# Patient Record
Sex: FEMALE | Race: BLACK OR AFRICAN AMERICAN | NOT HISPANIC OR LATINO | Employment: FULL TIME | URBAN - METROPOLITAN AREA
[De-identification: names, ages, dates, MRNs, and addresses within clinical notes are randomized per-mention and may not be internally consistent; named-entity substitution may affect disease eponyms.]

---

## 2018-01-15 ENCOUNTER — LAB VISIT (OUTPATIENT)
Dept: LAB | Facility: HOSPITAL | Age: 38
End: 2018-01-15
Attending: OBSTETRICS & GYNECOLOGY
Payer: COMMERCIAL

## 2018-01-15 ENCOUNTER — OFFICE VISIT (OUTPATIENT)
Dept: OBSTETRICS AND GYNECOLOGY | Facility: CLINIC | Age: 38
End: 2018-01-15
Payer: COMMERCIAL

## 2018-01-15 VITALS
HEIGHT: 66 IN | BODY MASS INDEX: 43.52 KG/M2 | SYSTOLIC BLOOD PRESSURE: 122 MMHG | DIASTOLIC BLOOD PRESSURE: 81 MMHG | WEIGHT: 270.81 LBS

## 2018-01-15 DIAGNOSIS — R53.82 CHRONIC FATIGUE: ICD-10-CM

## 2018-01-15 DIAGNOSIS — N76.0 BACTERIAL VAGINOSIS: ICD-10-CM

## 2018-01-15 DIAGNOSIS — Z12.31 SCREENING MAMMOGRAM, ENCOUNTER FOR: ICD-10-CM

## 2018-01-15 DIAGNOSIS — Z01.419 ENCOUNTER FOR GYNECOLOGICAL EXAMINATION (GENERAL) (ROUTINE) WITHOUT ABNORMAL FINDINGS: Primary | ICD-10-CM

## 2018-01-15 DIAGNOSIS — N92.6 IRREGULAR BLEEDING: ICD-10-CM

## 2018-01-15 DIAGNOSIS — B96.89 BACTERIAL VAGINOSIS: ICD-10-CM

## 2018-01-15 LAB
25(OH)D3+25(OH)D2 SERPL-MCNC: 22 NG/ML
ERYTHROCYTE [DISTWIDTH] IN BLOOD BY AUTOMATED COUNT: 13.4 %
HCT VFR BLD AUTO: 33.8 %
HGB BLD-MCNC: 10.5 G/DL
MCH RBC QN AUTO: 27.4 PG
MCHC RBC AUTO-ENTMCNC: 31.1 G/DL
MCV RBC AUTO: 88 FL
PLATELET # BLD AUTO: 408 K/UL
PMV BLD AUTO: 11.2 FL
RBC # BLD AUTO: 3.83 M/UL
T3FREE SERPL-MCNC: 2.1 PG/ML
T4 SERPL-MCNC: 6.5 UG/DL
TSH SERPL DL<=0.005 MIU/L-ACNC: 0.69 UIU/ML
WBC # BLD AUTO: 8.49 K/UL

## 2018-01-15 PROCEDURE — 88175 CYTOPATH C/V AUTO FLUID REDO: CPT

## 2018-01-15 PROCEDURE — 99999 PR PBB SHADOW E&M-NEW PATIENT-LVL II: CPT | Mod: PBBFAC,,, | Performed by: OBSTETRICS & GYNECOLOGY

## 2018-01-15 PROCEDURE — 84443 ASSAY THYROID STIM HORMONE: CPT

## 2018-01-15 PROCEDURE — 36415 COLL VENOUS BLD VENIPUNCTURE: CPT | Mod: PO

## 2018-01-15 PROCEDURE — 84436 ASSAY OF TOTAL THYROXINE: CPT

## 2018-01-15 PROCEDURE — 82306 VITAMIN D 25 HYDROXY: CPT

## 2018-01-15 PROCEDURE — 87480 CANDIDA DNA DIR PROBE: CPT

## 2018-01-15 PROCEDURE — 99395 PREV VISIT EST AGE 18-39: CPT | Mod: S$GLB,,, | Performed by: OBSTETRICS & GYNECOLOGY

## 2018-01-15 PROCEDURE — 85027 COMPLETE CBC AUTOMATED: CPT

## 2018-01-15 PROCEDURE — 84481 FREE ASSAY (FT-3): CPT

## 2018-01-15 RX ORDER — OXYCODONE AND ACETAMINOPHEN 7.5; 325 MG/1; MG/1
1 TABLET ORAL
COMMUNITY
End: 2018-01-15

## 2018-01-15 NOTE — PROGRESS NOTES
Subjective:       Patient ID: Santhosh Olsen is a 37 y.o. female.    Chief Complaint:  Well Woman      History of Present Illness  HPI  Annual Exam-Premenopausal  Patient presents for annual exam. The patient c/o irreg bleeding and vaginal discharge; and increased fatigue for past year;   The patient is sexually active--denies pelvic pain;reports decreased libido;  GYN screening history: last pap: approximate date  and was normal. The patient wears seatbelts: yes. The patient participates in regular exercise: no. Has the patient ever been transfused or tattooed?: yes--tatooes . The patient reports that there is not domestic violence in her life.    Mom  from ov cancer 2016    Maternal male cousin diagnosed with breast cancer    C/o vaginal discharge for past 2 wks--reports no change in soaps/detergents;       Had ablation , amenorrheic for 1 yr; menses monthly until 2017, now bleeds q 21 days; flow 3 d, pads-reg; change q3 hrs; no double up; no soak through; increased dysmenorrhea--advil/aleve--takes edge off      GYN & OB History  Patient's last menstrual period was 2018.   Date of Last Pap: No result found    OB History    Para Term  AB Living   3 3       3   SAB TAB Ectopic Multiple Live Births           3      # Outcome Date GA Lbr Manav/2nd Weight Sex Delivery Anes PTL Lv   3 Para      CS-LTranv   LEONEL   2 Para      CS-LTranv   LEONEL   1 Para      CS-LTranv   LEONEL          Review of Systems  Review of Systems   Constitutional: Negative for activity change, appetite change, chills, diaphoresis, fatigue, fever and unexpected weight change.   HENT: Negative for mouth sores and tinnitus.    Eyes: Negative for discharge and visual disturbance.   Respiratory: Negative for cough, shortness of breath and wheezing.    Cardiovascular: Negative for chest pain, palpitations and leg swelling.   Gastrointestinal: Negative for abdominal pain, bloating, blood in stool, constipation, diarrhea,  nausea and vomiting.   Endocrine: Negative for diabetes, hair loss, hot flashes, hyperthyroidism and hypothyroidism.   Genitourinary: Positive for decreased libido, menstrual problem, vaginal discharge and dysmenorrhea. Negative for dyspareunia, dysuria, flank pain, frequency, genital sores, hematuria, menorrhagia, pelvic pain, urgency, vaginal bleeding, vaginal pain, urinary incontinence, postcoital bleeding, postmenopausal bleeding and vaginal odor.   Musculoskeletal: Negative for back pain and myalgias.   Skin:  Negative for rash, no acne and hair changes.   Neurological: Negative for seizures, syncope, numbness and headaches.   Hematological: Negative for adenopathy. Does not bruise/bleed easily.   Psychiatric/Behavioral: Negative for depression and sleep disturbance. The patient is not nervous/anxious.    Breast: Negative for breast mass, breast pain, nipple discharge and skin changes          Objective:    Physical Exam:   Constitutional: She appears well-developed.     Eyes: Conjunctivae and EOM are normal. Pupils are equal, round, and reactive to light.    Neck: Normal range of motion. Neck supple.     Pulmonary/Chest: Effort normal. Right breast exhibits no mass, no nipple discharge, no skin change and no tenderness. Left breast exhibits no mass, no nipple discharge, no skin change and no tenderness. Breasts are symmetrical.        Abdominal: Soft.     Genitourinary: Rectum normal and uterus normal. Pelvic exam was performed with patient supine. Cervix is normal. Right adnexum displays no mass and no tenderness. Left adnexum displays no mass and no tenderness. No erythema, bleeding, rectocele, cystocele or unspecified prolapse of vaginal walls in the vagina. Vaginal discharge: white. Labial bartholins normal.       Uterus Size: 6 cm   Musculoskeletal: Normal range of motion.       Neurological: She is alert.    Skin: Skin is warm.    Psychiatric: She has a normal mood and affect.          Assessment:          Encounter Diagnoses   Name Primary?    Chronic fatigue     Encounter for gynecological examination (general) (routine) without abnormal findings Yes    Screening mammogram, encounter for     Bacterial vaginosis     Irregular bleeding                Plan:      Continue annual well woman exam.   pap today  Affirm today to eval discharge  sono ordered to eval ut for etiol of abnl bleeding  Cbc,thyroid, vit d to eval etiol of fatigue  Continue diet, exercise, weight loss

## 2018-01-16 ENCOUNTER — PATIENT MESSAGE (OUTPATIENT)
Dept: OBSTETRICS AND GYNECOLOGY | Facility: CLINIC | Age: 38
End: 2018-01-16

## 2018-01-16 LAB
CANDIDA RRNA VAG QL PROBE: NEGATIVE
G VAGINALIS RRNA GENITAL QL PROBE: POSITIVE
T VAGINALIS RRNA GENITAL QL PROBE: NEGATIVE

## 2018-01-17 ENCOUNTER — PATIENT MESSAGE (OUTPATIENT)
Dept: OBSTETRICS AND GYNECOLOGY | Facility: CLINIC | Age: 38
End: 2018-01-17

## 2018-01-17 DIAGNOSIS — B96.89 BACTERIAL VAGINOSIS: Primary | ICD-10-CM

## 2018-01-17 DIAGNOSIS — N76.0 BACTERIAL VAGINOSIS: Primary | ICD-10-CM

## 2018-01-17 RX ORDER — METRONIDAZOLE 500 MG/1
500 TABLET ORAL EVERY 12 HOURS
Qty: 14 TABLET | Refills: 0 | Status: SHIPPED | OUTPATIENT
Start: 2018-01-17 | End: 2018-01-24

## 2018-01-18 ENCOUNTER — PROCEDURE VISIT (OUTPATIENT)
Dept: OBSTETRICS AND GYNECOLOGY | Facility: CLINIC | Age: 38
End: 2018-01-18
Payer: COMMERCIAL

## 2018-01-18 DIAGNOSIS — N92.6 IRREGULAR BLEEDING: ICD-10-CM

## 2018-01-18 PROCEDURE — 76856 US EXAM PELVIC COMPLETE: CPT | Mod: S$GLB,,, | Performed by: OBSTETRICS & GYNECOLOGY

## 2018-01-22 ENCOUNTER — PATIENT MESSAGE (OUTPATIENT)
Dept: OBSTETRICS AND GYNECOLOGY | Facility: HOSPITAL | Age: 38
End: 2018-01-22

## 2018-01-26 ENCOUNTER — OFFICE VISIT (OUTPATIENT)
Dept: OBSTETRICS AND GYNECOLOGY | Facility: CLINIC | Age: 38
End: 2018-01-26
Payer: COMMERCIAL

## 2018-01-26 VITALS
SYSTOLIC BLOOD PRESSURE: 125 MMHG | BODY MASS INDEX: 43.4 KG/M2 | DIASTOLIC BLOOD PRESSURE: 74 MMHG | HEIGHT: 66 IN | WEIGHT: 270.06 LBS

## 2018-01-26 DIAGNOSIS — N92.6 IRREGULAR MENSES: Primary | ICD-10-CM

## 2018-01-26 DIAGNOSIS — D25.1 FIBROIDS, INTRAMURAL: ICD-10-CM

## 2018-01-26 PROCEDURE — 99999 PR PBB SHADOW E&M-EST. PATIENT-LVL II: CPT | Mod: PBBFAC,,, | Performed by: OBSTETRICS & GYNECOLOGY

## 2018-01-26 PROCEDURE — 99213 OFFICE O/P EST LOW 20 MIN: CPT | Mod: S$GLB,,, | Performed by: OBSTETRICS & GYNECOLOGY

## 2018-01-26 NOTE — PROGRESS NOTES
Subjective:       Patient ID: Santhosh Olsen is a 37 y.o. female.    Chief Complaint:  Follow-up (US)      History of Present Illness  HPI  here for f/u   C/o menses have returned (since ablation); irreg and dysmenorrhea    sono reviewed    Uterus: Visualized  Uterus position: retroverted  Endometrium: distorted by presence of fibroids  Uterus long 9.3 cm  Uterus ap 0.4 cm  Uterus tr 4.6 cm  Uterus vol 8.5 cmÂ³  Endometrial thickness, total 65.4 mm  Fibroids: Fibroids identified  Findings: Intramural. Posterior  D1 57.0 mm  D2 54.7 mm  D3 40.4 mm  Mean 50.7 mm  Vol 65.954 cmÂ³    Right Ovary  =========  Rt ovary: Visualized  Rt ovary D1 2.8 cm  Rt ovary D2 1.5 cm  Rt ovary D3 3.2 cm  Rt ovary mean 2.5 cm  Rt ovary vol 6.7 cmÂ³    Left Ovary  ========  Lt ovary: Visualized  Lt ovary D1 2.1 cm  Lt ovary D2 2.5 cm  Lt ovary D3 2.1 cm  Lt ovary mean 2.2 cm  Lt ovary vol 5.7 cmÂ³    Impression  =========  Intramural fibroid seen with calcifications measuring 5.7 x 5.5 x 4.0cm  Ovaries and adnexa appear normal  No free fluid seen in cul de sac .                                                     GYN & OB History  Patient's last menstrual period was 2018 (approximate).   Date of Last Pap: 2018    OB History    Para Term  AB Living   3 3       3   SAB TAB Ectopic Multiple Live Births           3      # Outcome Date GA Lbr Manav/2nd Weight Sex Delivery Anes PTL Lv   3 Para      CS-LTranv   LEONEL   2 Para      CS-LTranv   LEONEL   1 Para      CS-LTranv   LEONEL          Review of Systems  Review of Systems   Constitutional: Negative for activity change, appetite change, chills, diaphoresis, fatigue, fever and unexpected weight change.   HENT: Negative for mouth sores and tinnitus.    Eyes: Negative for discharge and visual disturbance.   Respiratory: Negative for cough, shortness of breath and wheezing.    Cardiovascular: Negative for chest pain, palpitations and leg swelling.   Gastrointestinal: Negative for  abdominal pain, bloating, blood in stool, constipation, diarrhea, nausea and vomiting.   Endocrine: Negative for diabetes, hair loss, hot flashes, hyperthyroidism and hypothyroidism.   Genitourinary: Positive for menorrhagia, menstrual problem and dysmenorrhea. Negative for decreased libido, dyspareunia, dysuria, flank pain, frequency, genital sores, hematuria, pelvic pain, urgency, vaginal bleeding, vaginal discharge, vaginal pain, urinary incontinence, postcoital bleeding, postmenopausal bleeding and vaginal odor.   Musculoskeletal: Negative for back pain and myalgias.   Skin:  Negative for rash, no acne and hair changes.   Neurological: Negative for seizures, syncope, numbness and headaches.   Hematological: Negative for adenopathy. Does not bruise/bleed easily.   Psychiatric/Behavioral: Negative for depression and sleep disturbance. The patient is not nervous/anxious.    Breast: Negative for breast mass, breast pain, nipple discharge and skin changes          Objective:    Physical Exam:   Constitutional: She appears well-developed.     Eyes: Conjunctivae and EOM are normal. Pupils are equal, round, and reactive to light.    Neck: Normal range of motion. Neck supple.     Pulmonary/Chest: Effort normal.        Abdominal: Soft.             Musculoskeletal: Normal range of motion.       Neurological: She is alert.    Skin: Skin is warm.    Psychiatric: She has a normal mood and affect.          Assessment:     Encounter Diagnoses   Name Primary?    Irregular menses Yes    Fibroids, intramural              Plan:      Reviewed options for dysmenorrhea, irreg bleeding  Ocp, depo, attempt at reablation; hysterectomy  Pt to continue to observe menstrual cycle for now  Continue vit d supplement

## 2018-02-14 ENCOUNTER — PATIENT MESSAGE (OUTPATIENT)
Dept: OBSTETRICS AND GYNECOLOGY | Facility: CLINIC | Age: 38
End: 2018-02-14

## 2018-05-23 ENCOUNTER — TELEPHONE (OUTPATIENT)
Dept: OBSTETRICS AND GYNECOLOGY | Facility: CLINIC | Age: 38
End: 2018-05-23

## 2018-05-23 NOTE — TELEPHONE ENCOUNTER
----- Message from Rosalinda Diallo sent at 5/23/2018 12:06 PM CDT -----  Contact: pt  The pt request a return call, no additional info given, can be reached at [phone]///[id number]

## 2018-10-05 ENCOUNTER — OFFICE VISIT (OUTPATIENT)
Dept: OBSTETRICS AND GYNECOLOGY | Facility: CLINIC | Age: 38
End: 2018-10-05
Payer: COMMERCIAL

## 2018-10-05 VITALS
WEIGHT: 253.06 LBS | DIASTOLIC BLOOD PRESSURE: 70 MMHG | SYSTOLIC BLOOD PRESSURE: 108 MMHG | HEIGHT: 66 IN | BODY MASS INDEX: 40.67 KG/M2

## 2018-10-05 DIAGNOSIS — N94.6 DYSMENORRHEA: ICD-10-CM

## 2018-10-05 DIAGNOSIS — D25.1 FIBROIDS, INTRAMURAL: Primary | ICD-10-CM

## 2018-10-05 DIAGNOSIS — N92.1 METRORRHAGIA: ICD-10-CM

## 2018-10-05 PROBLEM — M41.9 SCOLIOSIS: Status: ACTIVE | Noted: 2018-10-05

## 2018-10-05 PROCEDURE — 99213 OFFICE O/P EST LOW 20 MIN: CPT | Mod: S$GLB,,, | Performed by: OBSTETRICS & GYNECOLOGY

## 2018-10-05 PROCEDURE — 3008F BODY MASS INDEX DOCD: CPT | Mod: CPTII,S$GLB,, | Performed by: OBSTETRICS & GYNECOLOGY

## 2018-10-05 PROCEDURE — 99999 PR PBB SHADOW E&M-EST. PATIENT-LVL II: CPT | Mod: PBBFAC,,, | Performed by: OBSTETRICS & GYNECOLOGY

## 2018-10-05 RX ORDER — MEFENAMIC ACID 250 MG/1
250 CAPSULE ORAL 4 TIMES DAILY
Qty: 20 EACH | Refills: 9 | Status: SHIPPED | OUTPATIENT
Start: 2018-10-05 | End: 2019-10-05

## 2018-10-05 NOTE — PROGRESS NOTES
Subjective:       Patient ID: Santhosh Olsen is a 38 y.o. female.    Chief Complaint:  Dysmenorrhea      History of Present Illness  HPI  Here for problem  C/o irreg bleeding  Reports menses q 2 wks; flow 4 days; using reg pad; change q 4 hrs;   C/o worsening dysmenorrhea despite ibuprofen 800mg    Hx of c/section x 3 and ablation     Know fibroid uterus    GYN & OB History  Patient's last menstrual period was 09/10/2018.   Date of Last Pap: 2018    OB History    Para Term  AB Living   3 3       3   SAB TAB Ectopic Multiple Live Births           3      # Outcome Date GA Lbr Manav/2nd Weight Sex Delivery Anes PTL Lv   3 Para      CS-LTranv   LEONEL   2 Para      CS-LTranv   LEONEL   1 Para      CS-LTranv   LEONEL          Review of Systems  Review of Systems   Genitourinary: Positive for menstrual problem and dysmenorrhea.   All other systems reviewed and are negative.          Objective:      Physical Exam:   Constitutional: She appears well-developed.     Eyes: Conjunctivae and EOM are normal. Pupils are equal, round, and reactive to light.    Neck: Normal range of motion. Neck supple.     Pulmonary/Chest: Effort normal.        Abdominal: Soft.             Musculoskeletal: Normal range of motion.       Neurological: She is alert.    Skin: Skin is warm.    Psychiatric: She has a normal mood and affect.           Assessment:        1. Fibroids, intramural    2. Metrorrhagia    3. Dysmenorrhea               Plan:      reveiwed options for irreg bleeding/dysmenorrhea  Cyclic ocp,depo shot , mirena/jose raul; hysterectomy    pt wishes to try kylena --rx sent   If unable to acquire, will return for depo shot

## 2018-10-08 ENCOUNTER — TELEPHONE (OUTPATIENT)
Dept: PHARMACY | Facility: CLINIC | Age: 38
End: 2018-10-08

## 2018-10-08 NOTE — TELEPHONE ENCOUNTER
Good Morning,      [facility] received a prescription for KYLEENA. Upon calling the patient's insurance company, we have been told that this medication is not covered under the patient's pharmacy benefits. [facility] is unable to bill medical claims for medications.      The medication itself, and the administration of the medication, will both have to be billed under the medical benefit and may require a prior authorization. Please contact [facility] with any questions at [phone].      Thank you,       Radha Camarena      Patient Care Advocate      [facility]

## 2018-10-09 ENCOUNTER — TELEPHONE (OUTPATIENT)
Dept: OBSTETRICS AND GYNECOLOGY | Facility: CLINIC | Age: 38
End: 2018-10-09

## 2018-10-09 NOTE — TELEPHONE ENCOUNTER
Pt.'s Kyleena is a buy & bill. [facility]. joaquina,nicholas Camarena sent to Miracle Henriquez MD; TI[facility] Staff   Cc: [facility]             Good Morning,                   [facility] received a prescription for KYLEENA. Upon calling the patient's insurance company, we have been told that this medication is not covered under the patient's pharmacy benefits. [facility] is unable to bill medical claims for medications.                   The medication itself, and the administration of the medication, will both have to be billed under the medical benefit and may require a prior authorization. Please contact [facility] with any questions at [phone].                   Thank you,                                           Radha Camarena                                       Patient Care Advocate                                       [facility]

## 2018-10-10 ENCOUNTER — TELEPHONE (OUTPATIENT)
Dept: OBSTETRICS AND GYNECOLOGY | Facility: CLINIC | Age: 38
End: 2018-10-10

## 2018-10-10 NOTE — TELEPHONE ENCOUNTER
Spoke to the pt. And advised her to call when she is omn her cycle to have her IUD placed. Kuyleena device is at the [place] location. nicholas Coyle